# Patient Record
Sex: FEMALE | Race: BLACK OR AFRICAN AMERICAN
[De-identification: names, ages, dates, MRNs, and addresses within clinical notes are randomized per-mention and may not be internally consistent; named-entity substitution may affect disease eponyms.]

---

## 2020-08-26 LAB
BASOPHILS # BLD AUTO: 0.1 10*3/UL (ref 0–0.1)
BASOPHILS NFR BLD AUTO: 1.1 % (ref 0–1)
DEPRECATED NEUTROPHILS # BLD AUTO: 4.8 10*3/UL (ref 2.1–6.9)
EOSINOPHIL # BLD AUTO: 0.2 10*3/UL (ref 0–0.4)
EOSINOPHIL NFR BLD AUTO: 2.7 % (ref 0–6)
ERYTHROCYTE [DISTWIDTH] IN CORD BLOOD: 14.6 % (ref 11.7–14.4)
HCT VFR BLD AUTO: 34.1 % (ref 34.2–44.1)
HGB BLD-MCNC: 10.7 G/DL (ref 12–16)
LYMPHOCYTES # BLD: 3 10*3/UL (ref 1–3.2)
LYMPHOCYTES NFR BLD AUTO: 33.6 % (ref 18–39.1)
MCH RBC QN AUTO: 26.4 PG (ref 28–32)
MCHC RBC AUTO-ENTMCNC: 31.4 G/DL (ref 31–35)
MCV RBC AUTO: 84 FL (ref 81–99)
MONOCYTES # BLD AUTO: 0.7 10*3/UL (ref 0.2–0.8)
MONOCYTES NFR BLD AUTO: 7.9 % (ref 4.4–11.3)
NEUTS SEG NFR BLD AUTO: 54.4 % (ref 38.7–80)
PLATELET # BLD AUTO: 326 X10E3/UL (ref 140–360)
RBC # BLD AUTO: 4.06 X10E6/UL (ref 3.6–5.1)

## 2020-08-31 ENCOUNTER — HOSPITAL ENCOUNTER (OUTPATIENT)
Dept: HOSPITAL 88 - ENDO | Age: 65
Discharge: HOME | End: 2020-08-31
Attending: SURGERY
Payer: COMMERCIAL

## 2020-08-31 VITALS — SYSTOLIC BLOOD PRESSURE: 136 MMHG | DIASTOLIC BLOOD PRESSURE: 82 MMHG

## 2020-08-31 DIAGNOSIS — Z98.84: ICD-10-CM

## 2020-08-31 DIAGNOSIS — K21.9: Primary | ICD-10-CM

## 2020-08-31 DIAGNOSIS — Z01.812: ICD-10-CM

## 2020-08-31 DIAGNOSIS — I44.0: ICD-10-CM

## 2020-08-31 DIAGNOSIS — K31.89: ICD-10-CM

## 2020-08-31 DIAGNOSIS — G47.33: ICD-10-CM

## 2020-08-31 DIAGNOSIS — R01.1: ICD-10-CM

## 2020-08-31 DIAGNOSIS — Z01.810: ICD-10-CM

## 2020-08-31 DIAGNOSIS — I10: ICD-10-CM

## 2020-08-31 DIAGNOSIS — R00.1: ICD-10-CM

## 2020-08-31 DIAGNOSIS — K29.70: ICD-10-CM

## 2020-08-31 DIAGNOSIS — Z11.59: ICD-10-CM

## 2020-08-31 DIAGNOSIS — K31.1: ICD-10-CM

## 2020-08-31 PROCEDURE — 88312 SPECIAL STAINS GROUP 1: CPT

## 2020-08-31 PROCEDURE — 88305 TISSUE EXAM BY PATHOLOGIST: CPT

## 2020-08-31 PROCEDURE — 43239 EGD BIOPSY SINGLE/MULTIPLE: CPT

## 2020-08-31 PROCEDURE — 93005 ELECTROCARDIOGRAM TRACING: CPT

## 2020-08-31 PROCEDURE — 36415 COLL VENOUS BLD VENIPUNCTURE: CPT

## 2020-08-31 PROCEDURE — 85025 COMPLETE CBC W/AUTO DIFF WBC: CPT

## 2020-08-31 NOTE — OPERATIVE REPORT
DATE OF PROCEDURE:  08/31/2020

 

SURGEON:  Michael Aldana MD

 

PREOPERATIVE DIAGNOSIS:  Gastroesophageal reflux disease.

 

POSTOPERATIVE DIAGNOSES:  

1. Gastroesophageal reflux disease.

2. Gastric outlet obstruction with stenosis at the level of lap band.

3. Gastritis in the antrum.

 

PREOPERATIVE INDICATION:  Assess for mucosal disease.

 

PROCEDURES:  EGD with biopsy (CPT 52456).

 

ANESTHESIA:  Moderate sedation with IV propofol.

 

ASSISTANT:  None.

 

FLUIDS:  As per anesthesia.

 

ESTIMATED BLOOD LOSS:  Minimal.

 

DRAINS:  None.

 

COMPLICATIONS:  None.

 

SPECIMENS:  Antral biopsy x2 using cold forceps.

 

FINDINGS:  

1. Gastric outlet obstruction at the level of the fundus with the lap band placed.

2. Gastritis in the antrum.

 

PROCEDURE IN DETAIL:  The patient was brought to the endoscopy suite and sedated with IV

propofol.  A preprocedure pause was performed.  An adult-sized endoscope was introduced

to the oropharynx and guided to the 2nd portion of the duodenum, noted narrowing at the

level of the gastric band in the proximal fundus, making the progression of the scope

difficult.  However, I was able to traverse this area and took some biopsies of the

gastric antrum, where there was some evidence of inflammation.  This was completed, we

then desufflated the stomach and removed the endoscope.  The patient tolerated the

procedure well.  Type of wound was type 1, clean. 

 

 

 

 

______________________________

Michael Aldana MD

 

C/MODL

D:  08/31/2020 07:49:30

T:  08/31/2020 08:42:57

Job #:  [id number]

## 2020-10-01 LAB
BASOPHILS # BLD AUTO: 0.1 10*3/UL (ref 0–0.1)
BASOPHILS NFR BLD AUTO: 1 % (ref 0–1)
DEPRECATED NEUTROPHILS # BLD AUTO: 5.8 10*3/UL (ref 2.1–6.9)
EOSINOPHIL # BLD AUTO: 0.2 10*3/UL (ref 0–0.4)
EOSINOPHIL NFR BLD AUTO: 2 % (ref 0–6)
ERYTHROCYTE [DISTWIDTH] IN CORD BLOOD: 14.1 % (ref 11.7–14.4)
HCT VFR BLD AUTO: 34.8 % (ref 34.2–44.1)
HGB BLD-MCNC: 10.9 G/DL (ref 12–16)
LYMPHOCYTES # BLD: 3.2 10*3/UL (ref 1–3.2)
LYMPHOCYTES NFR BLD AUTO: 31.5 % (ref 18–39.1)
MCH RBC QN AUTO: 26.1 PG (ref 28–32)
MCHC RBC AUTO-ENTMCNC: 31.3 G/DL (ref 31–35)
MCV RBC AUTO: 83.5 FL (ref 81–99)
MONOCYTES # BLD AUTO: 0.8 10*3/UL (ref 0.2–0.8)
MONOCYTES NFR BLD AUTO: 8 % (ref 4.4–11.3)
NEUTS SEG NFR BLD AUTO: 57.1 % (ref 38.7–80)
PLATELET # BLD AUTO: 350 X10E3/UL (ref 140–360)
RBC # BLD AUTO: 4.17 X10E6/UL (ref 3.6–5.1)

## 2020-10-01 NOTE — DIAGNOSTIC IMAGING REPORT
EXAMINATION: PA and lateral views of the chest.



COMPARISON: None



CLINICAL HISTORY:  Preop for lap band removal

     

DISCUSSION:



Lines/tubes:  None.



Lungs:  The lungs are well inflated and clear. There is no evidence of

pneumonia or pulmonary edema.



Pleura:  There is no pleural effusion or pneumothorax.



Heart and mediastinum:  Cardiomediastinal silhouette is unremarkable.   

Pulmonary vasculature is normal.



Bones and soft tissues:  No acute bony abnormalities.  Degenerative changes in

the thoracic spine. Lap band in satisfactory position near the GE junction with

port and connecting tube.



IMPRESSION: 

No acute cardiopulmonary abnormalities.











Signed by: Dr. Pablo Fisher M.D. on 10/1/2020 3:33 PM

## 2020-10-05 ENCOUNTER — HOSPITAL ENCOUNTER (OUTPATIENT)
Dept: HOSPITAL 88 - OR | Age: 65
Discharge: HOME | End: 2020-10-05
Attending: SURGERY
Payer: COMMERCIAL

## 2020-10-05 VITALS — SYSTOLIC BLOOD PRESSURE: 168 MMHG | DIASTOLIC BLOOD PRESSURE: 72 MMHG

## 2020-10-05 DIAGNOSIS — R01.1: ICD-10-CM

## 2020-10-05 DIAGNOSIS — Z46.51: Primary | ICD-10-CM

## 2020-10-05 DIAGNOSIS — Z20.828: ICD-10-CM

## 2020-10-05 DIAGNOSIS — F41.9: ICD-10-CM

## 2020-10-05 DIAGNOSIS — D64.9: ICD-10-CM

## 2020-10-05 DIAGNOSIS — K21.9: ICD-10-CM

## 2020-10-05 DIAGNOSIS — G47.33: ICD-10-CM

## 2020-10-05 DIAGNOSIS — R07.9: ICD-10-CM

## 2020-10-05 DIAGNOSIS — I10: ICD-10-CM

## 2020-10-05 DIAGNOSIS — R00.1: ICD-10-CM

## 2020-10-05 DIAGNOSIS — Z01.818: ICD-10-CM

## 2020-10-05 DIAGNOSIS — K31.1: ICD-10-CM

## 2020-10-05 DIAGNOSIS — Z01.812: ICD-10-CM

## 2020-10-05 DIAGNOSIS — K29.70: ICD-10-CM

## 2020-10-05 DIAGNOSIS — K43.9: ICD-10-CM

## 2020-10-05 DIAGNOSIS — I44.0: ICD-10-CM

## 2020-10-05 LAB
ANION GAP SERPL CALC-SCNC: 16.7 MMOL/L (ref 8–16)
BUN SERPL-MCNC: 14 MG/DL (ref 7–26)
BUN/CREAT SERPL: 11 (ref 6–25)
CALCIUM SERPL-MCNC: 9.5 MG/DL (ref 8.4–10.2)
CHLORIDE SERPL-SCNC: 106 MMOL/L (ref 98–107)
CO2 SERPL-SCNC: 25 MMOL/L (ref 22–29)
EGFRCR SERPLBLD CKD-EPI 2021: 51 ML/MIN (ref 60–?)
GLUCOSE SERPLBLD-MCNC: 97 MG/DL (ref 74–118)
POTASSIUM SERPL-SCNC: 3.7 MMOL/L (ref 3.5–5.1)
SODIUM SERPL-SCNC: 144 MMOL/L (ref 136–145)

## 2020-10-05 PROCEDURE — 80048 BASIC METABOLIC PNL TOTAL CA: CPT

## 2020-10-05 PROCEDURE — 36415 COLL VENOUS BLD VENIPUNCTURE: CPT

## 2020-10-05 PROCEDURE — 71046 X-RAY EXAM CHEST 2 VIEWS: CPT

## 2020-10-05 PROCEDURE — 85025 COMPLETE CBC W/AUTO DIFF WBC: CPT

## 2020-10-05 PROCEDURE — 43772 LAP RMVL GASTR ADJ DEVICE: CPT

## 2020-10-05 NOTE — OPERATIVE REPORT
DATE OF PROCEDURE:  10/05/2020

 

SURGEON:  Michael Aldana MD

 

PREOPERATIVE DIAGNOSES:  

1. Gastric outlet obstruction.

2. Chronic gastroesophageal reflux disease.

3. Anemia.

4. Status post lap band placement.

 

POSTOPERATIVE DIAGNOSES:  

1. Gastric outlet obstruction.

2. Chronic gastroesophageal reflux disease.

3. Anemia.

4. Status post lap band placement.

 

PREOPERATIVE INDICATION:  Treat disease, prevent complications related to slipped lap

band and related complications. 

 

PROCEDURES:  

1. Laparoscopic removal of adjustable gastric band and subcutaneous port.

2. Open repair of ventral hernia (at the level of where the subcutaneous port was

placed). 

 

ANESTHESIA:  General.

 

ASSISTANT:  Gilberto Duenas, surgical first assistant (needed due to complexity of

case). 

 

FLUIDS:  1 L crystalloid.

 

ESTIMATED BLOOD LOSS:  30 mL.

 

DRAINS:  None.

 

COMPLICATION:  None.

 

SPECIMENS:  Adjustable gastric band with subcutaneous port and entire length of catheter.

 

GRAFTS:  None.

 

FINDINGS:  

1. Intra-abdominal adhesions.

2. Ventral hernia at the level of subcutaneous port.

 

PROCEDURE IN DETAIL:  The patient was brought to the operating room and was intubated

under general endotracheal anesthesia.  She was sterilely prepped and draped in the

usual fashion.  A preprocedure pause was performed identifying the patient, the use of

perioperative antibiotics, intended procedure, and staff surgeon. 

 

Access was gained via a 5 mm left subcostal incision using a Veress needle.  The abdomen

was insufflated.  Four additional trocars were placed in standard positions.  The liver

retractor was used to expose the lap band.  There were multiple adhesions between the

omentum and the anterior abdominal wall, which were carefully lysed.  I then mobilized

the surrounding structures around the lap band in order to free it up and the lap band

was then able to be isolated and cut.  It was removed along with portion of the

intra-abdominal catheter through the right periumbilical port site.  The port site was

closed with 0 Vicryl sutures using a Hollis-Camelia technique.  We then verified

hemostasis, removed the liver retractor and desufflated the abdomen.  The trocars were

removed.  Next, I turned my attention to the subcutaneous port.  A secondary incision

was made along the transverse line from previous incision.  Dissection was carried

through the subcutaneous tissue down to the level of the subcutaneous port.  The port

was intermittently adhesed to the fascia and had metal hooks posteriorly.  It was

carefully circumferentially dissected and removed with the rest of the intra-abdominal

portion of the catheter and was passed off.  This left a defect in the fascia and I

repaired this with 0 Vicryl suture in continuous fashion.  We closed the rest of that

port site with 3-0 Vicryl suture and 4-0 Monocryl suture in subcuticular fashion.  The

incision sites for the ports were also closed with 4-0 Monocryl suture in a subcuticular

fashion.  Dermabond dressings were applied.  A 0.25% bupivacaine was used both at the

preperitoneal incision sites.  The patient tolerated the procedure well.  Type of wound

was type 1, clean.  All surgical sponge and instrument counts were correct. 

 

 

 

 

______________________________

Michael Aldana MD

 

JORDY/MODL

D:  10/05/2020 11:44:08

T:  10/05/2020 12:17:50

Job #:  [id number]